# Patient Record
Sex: MALE | Race: WHITE | HISPANIC OR LATINO | URBAN - METROPOLITAN AREA
[De-identification: names, ages, dates, MRNs, and addresses within clinical notes are randomized per-mention and may not be internally consistent; named-entity substitution may affect disease eponyms.]

---

## 2021-07-18 ENCOUNTER — INPATIENT (INPATIENT)
Facility: HOSPITAL | Age: 46
LOS: 2 days | Discharge: HOME | DRG: 552 | End: 2021-07-21
Attending: INTERNAL MEDICINE | Admitting: HOSPITALIST
Payer: COMMERCIAL

## 2021-07-18 VITALS
SYSTOLIC BLOOD PRESSURE: 160 MMHG | DIASTOLIC BLOOD PRESSURE: 96 MMHG | TEMPERATURE: 98 F | RESPIRATION RATE: 20 BRPM | HEIGHT: 66 IN | OXYGEN SATURATION: 97 % | HEART RATE: 79 BPM | WEIGHT: 190.04 LBS

## 2021-07-18 DIAGNOSIS — M79.606 PAIN IN LEG, UNSPECIFIED: ICD-10-CM

## 2021-07-18 LAB
ANION GAP SERPL CALC-SCNC: 12 MMOL/L — SIGNIFICANT CHANGE UP (ref 5–17)
APPEARANCE UR: CLEAR — SIGNIFICANT CHANGE UP
BACTERIA # UR AUTO: NEGATIVE — SIGNIFICANT CHANGE UP
BASOPHILS # BLD AUTO: 0.08 K/UL — SIGNIFICANT CHANGE UP (ref 0–0.2)
BASOPHILS NFR BLD AUTO: 0.9 % — SIGNIFICANT CHANGE UP (ref 0–2)
BILIRUB UR-MCNC: NEGATIVE — SIGNIFICANT CHANGE UP
BUN SERPL-MCNC: 14 MG/DL — SIGNIFICANT CHANGE UP (ref 7–23)
CALCIUM SERPL-MCNC: 9.4 MG/DL — SIGNIFICANT CHANGE UP (ref 8.4–10.5)
CHLORIDE SERPL-SCNC: 105 MMOL/L — SIGNIFICANT CHANGE UP (ref 96–108)
CK SERPL-CCNC: 92 U/L — SIGNIFICANT CHANGE UP (ref 30–200)
CO2 SERPL-SCNC: 22 MMOL/L — SIGNIFICANT CHANGE UP (ref 22–31)
COLOR SPEC: SIGNIFICANT CHANGE UP
CREAT SERPL-MCNC: 0.82 MG/DL — SIGNIFICANT CHANGE UP (ref 0.5–1.3)
DIFF PNL FLD: ABNORMAL
EOSINOPHIL # BLD AUTO: 0.21 K/UL — SIGNIFICANT CHANGE UP (ref 0–0.5)
EOSINOPHIL NFR BLD AUTO: 2.4 % — SIGNIFICANT CHANGE UP (ref 0–6)
EPI CELLS # UR: 0 /HPF — SIGNIFICANT CHANGE UP
GLUCOSE SERPL-MCNC: 106 MG/DL — HIGH (ref 70–99)
GLUCOSE UR QL: NEGATIVE — SIGNIFICANT CHANGE UP
HCT VFR BLD CALC: 47.8 % — SIGNIFICANT CHANGE UP (ref 39–50)
HGB BLD-MCNC: 16 G/DL — SIGNIFICANT CHANGE UP (ref 13–17)
HYALINE CASTS # UR AUTO: 1 /LPF — SIGNIFICANT CHANGE UP (ref 0–2)
IMM GRANULOCYTES NFR BLD AUTO: 0.2 % — SIGNIFICANT CHANGE UP (ref 0–1.5)
KETONES UR-MCNC: NEGATIVE — SIGNIFICANT CHANGE UP
LEUKOCYTE ESTERASE UR-ACNC: NEGATIVE — SIGNIFICANT CHANGE UP
LYMPHOCYTES # BLD AUTO: 2.05 K/UL — SIGNIFICANT CHANGE UP (ref 1–3.3)
LYMPHOCYTES # BLD AUTO: 23.6 % — SIGNIFICANT CHANGE UP (ref 13–44)
MAGNESIUM SERPL-MCNC: 2.2 MG/DL — SIGNIFICANT CHANGE UP (ref 1.6–2.6)
MCHC RBC-ENTMCNC: 30 PG — SIGNIFICANT CHANGE UP (ref 27–34)
MCHC RBC-ENTMCNC: 33.5 GM/DL — SIGNIFICANT CHANGE UP (ref 32–36)
MCV RBC AUTO: 89.5 FL — SIGNIFICANT CHANGE UP (ref 80–100)
MONOCYTES # BLD AUTO: 0.77 K/UL — SIGNIFICANT CHANGE UP (ref 0–0.9)
MONOCYTES NFR BLD AUTO: 8.9 % — SIGNIFICANT CHANGE UP (ref 2–14)
NEUTROPHILS # BLD AUTO: 5.55 K/UL — SIGNIFICANT CHANGE UP (ref 1.8–7.4)
NEUTROPHILS NFR BLD AUTO: 64 % — SIGNIFICANT CHANGE UP (ref 43–77)
NITRITE UR-MCNC: NEGATIVE — SIGNIFICANT CHANGE UP
NRBC # BLD: 0 /100 WBCS — SIGNIFICANT CHANGE UP (ref 0–0)
PH UR: 7 — SIGNIFICANT CHANGE UP (ref 5–8)
PHOSPHATE SERPL-MCNC: 2.5 MG/DL — SIGNIFICANT CHANGE UP (ref 2.5–4.5)
PLATELET # BLD AUTO: 258 K/UL — SIGNIFICANT CHANGE UP (ref 150–400)
POTASSIUM SERPL-MCNC: 3.8 MMOL/L — SIGNIFICANT CHANGE UP (ref 3.5–5.3)
POTASSIUM SERPL-SCNC: 3.8 MMOL/L — SIGNIFICANT CHANGE UP (ref 3.5–5.3)
PROT UR-MCNC: NEGATIVE — SIGNIFICANT CHANGE UP
RBC # BLD: 5.34 M/UL — SIGNIFICANT CHANGE UP (ref 4.2–5.8)
RBC # FLD: 12.5 % — SIGNIFICANT CHANGE UP (ref 10.3–14.5)
RBC CASTS # UR COMP ASSIST: 2 /HPF — SIGNIFICANT CHANGE UP (ref 0–4)
SODIUM SERPL-SCNC: 139 MMOL/L — SIGNIFICANT CHANGE UP (ref 135–145)
SP GR SPEC: 1.01 — SIGNIFICANT CHANGE UP (ref 1.01–1.02)
UROBILINOGEN FLD QL: NEGATIVE — SIGNIFICANT CHANGE UP
WBC # BLD: 8.68 K/UL — SIGNIFICANT CHANGE UP (ref 3.8–10.5)
WBC # FLD AUTO: 8.68 K/UL — SIGNIFICANT CHANGE UP (ref 3.8–10.5)
WBC UR QL: 0 /HPF — SIGNIFICANT CHANGE UP (ref 0–5)

## 2021-07-18 PROCEDURE — 93971 EXTREMITY STUDY: CPT | Mod: 26,LT

## 2021-07-18 PROCEDURE — 73560 X-RAY EXAM OF KNEE 1 OR 2: CPT | Mod: 26,LT

## 2021-07-18 PROCEDURE — 73590 X-RAY EXAM OF LOWER LEG: CPT | Mod: 26,LT

## 2021-07-18 PROCEDURE — 73610 X-RAY EXAM OF ANKLE: CPT | Mod: 26,LT

## 2021-07-18 PROCEDURE — 99285 EMERGENCY DEPT VISIT HI MDM: CPT

## 2021-07-18 RX ORDER — DIAZEPAM 5 MG
5 TABLET ORAL ONCE
Refills: 0 | Status: DISCONTINUED | OUTPATIENT
Start: 2021-07-18 | End: 2021-07-18

## 2021-07-18 RX ORDER — ACETAMINOPHEN 500 MG
975 TABLET ORAL ONCE
Refills: 0 | Status: COMPLETED | OUTPATIENT
Start: 2021-07-18 | End: 2021-07-18

## 2021-07-18 RX ORDER — OXYCODONE HYDROCHLORIDE 5 MG/1
5 TABLET ORAL ONCE
Refills: 0 | Status: DISCONTINUED | OUTPATIENT
Start: 2021-07-18 | End: 2021-07-18

## 2021-07-18 RX ORDER — CYCLOBENZAPRINE HYDROCHLORIDE 10 MG/1
10 TABLET, FILM COATED ORAL ONCE
Refills: 0 | Status: COMPLETED | OUTPATIENT
Start: 2021-07-18 | End: 2021-07-18

## 2021-07-18 RX ORDER — IBUPROFEN 200 MG
400 TABLET ORAL ONCE
Refills: 0 | Status: COMPLETED | OUTPATIENT
Start: 2021-07-18 | End: 2021-07-18

## 2021-07-18 RX ORDER — LIDOCAINE 4 G/100G
1 CREAM TOPICAL ONCE
Refills: 0 | Status: COMPLETED | OUTPATIENT
Start: 2021-07-18 | End: 2021-07-18

## 2021-07-18 RX ADMIN — OXYCODONE HYDROCHLORIDE 5 MILLIGRAM(S): 5 TABLET ORAL at 19:42

## 2021-07-18 RX ADMIN — CYCLOBENZAPRINE HYDROCHLORIDE 10 MILLIGRAM(S): 10 TABLET, FILM COATED ORAL at 20:42

## 2021-07-18 RX ADMIN — Medication 5 MILLIGRAM(S): at 20:42

## 2021-07-18 RX ADMIN — Medication 975 MILLIGRAM(S): at 18:35

## 2021-07-18 RX ADMIN — LIDOCAINE 1 PATCH: 4 CREAM TOPICAL at 20:43

## 2021-07-18 RX ADMIN — Medication 400 MILLIGRAM(S): at 18:36

## 2021-07-18 NOTE — ED PROVIDER NOTE - PROGRESS NOTE DETAILS
Jon CHRISTIANSON (PGY-2)   pt was in worsening pain to his leg that he described before. ordered for oxycodone  orange tinged urine noted in urinal. he states he started noticing it last night. will order for ua/ucx Attending MD Palmer : On reassessment, paitent's XR normal, US normal and still in pain. On further history, patient stating pain feels likei t is radiating from his buttocks to his calf and causing the cramping. + SLR test to approx 30 degrees in LLE. Suspect scatica. Patient having difficulty ambulating 2/2 pain despite the pain medication and muscle relaxant he has been given. Will admit for pain control, PT/OT and lumbosacral MRI.

## 2021-07-18 NOTE — ED PROVIDER NOTE - OBJECTIVE STATEMENT
45 y/o M with pmhx of kidney stones presents to ED c/o intermittent L shin and calf pain x1 week, worsening over the last few days. Pt reports he was sleeping and when getting up out of bed he felt sudden LLE pain. Pain described as intermittent cramping sensation. Pain worsening over the last 2 days and now radiating to left hip. Pt states he feels most pain along his left shin and the pain is "inside" his leg. Pain radiates along lateral left leg up to left hip. Pt also reports left tingling but no numbness. Has been taking Tylenol with no relief, last dose this morning. Has also been applying ice with no relief. Reports difficulty ambulating 2/2 pain. Denies trauma, fall, or strenuous activity. Denies ankle pain, knee pain and weakness. Pt works in cleaning services and walks a lot.

## 2021-07-18 NOTE — ED PROVIDER NOTE - CLINICAL SUMMARY MEDICAL DECISION MAKING FREE TEXT BOX
DDx includes DVT vs tibial stress fracture vs electrolyte abnormality leading to muscle cramping. Will obtain X-ray of knee, tibfib, and ankle. Send for DVT US of LLE. Check electrolyte panel. Tylenol and ibuprofen for pain, and reassess.

## 2021-07-18 NOTE — ED ADULT NURSE NOTE - OBJECTIVE STATEMENT
45 y/o M, PMH kidney stones presents to ED c/o intermittent LLE pain x1 week, worse over the last few days, feels cramping, intermittent, now radiating to L hip in last few days. Reports he was getting OOB when he first felt the pain, no trauma/falls. Has been taking Tylenol and using ice at home with no relief, last dose Tylenol this am. Pr reports pain worse with ambulating.

## 2021-07-18 NOTE — ED PROVIDER NOTE - PHYSICAL EXAMINATION
GEN: NAD, awake, eyes open spontaneously  EYES: normal conjunctiva, perrl  CHEST/LUNGS: Non-tachypneic, CTAB, bilateral breath sounds  CARDIAC: Non-tachycardic, normal perfusion  ABDOMEN: Soft, NTND, No rebound/guarding  MSK: Left sided calf tenderness without overlying redness, warmth, or skin deformity. Strength and sensation intact in LLE. No pain with movement across left ankle or knee, but when pt moves his entire leg he feels pain is reproduced. No tenderness along the anterior tibia but pt states his pain is more in the muscles surrounding the tibia. Pt able to stand on left leg but having difficulty walking on leg.   SKIN: No rashes, no petechiae, no vesicles  NEURO: CN grossly intact, normal coordination, no focal motor or sensory deficits

## 2021-07-19 DIAGNOSIS — N20.0 CALCULUS OF KIDNEY: ICD-10-CM

## 2021-07-19 DIAGNOSIS — R09.89 OTHER SPECIFIED SYMPTOMS AND SIGNS INVOLVING THE CIRCULATORY AND RESPIRATORY SYSTEMS: ICD-10-CM

## 2021-07-19 DIAGNOSIS — Z29.9 ENCOUNTER FOR PROPHYLACTIC MEASURES, UNSPECIFIED: ICD-10-CM

## 2021-07-19 DIAGNOSIS — M79.605 PAIN IN LEFT LEG: ICD-10-CM

## 2021-07-19 LAB
CULTURE RESULTS: NO GROWTH — SIGNIFICANT CHANGE UP
SARS-COV-2 RNA SPEC QL NAA+PROBE: SIGNIFICANT CHANGE UP
SPECIMEN SOURCE: SIGNIFICANT CHANGE UP

## 2021-07-19 PROCEDURE — 72148 MRI LUMBAR SPINE W/O DYE: CPT | Mod: 26

## 2021-07-19 PROCEDURE — 99223 1ST HOSP IP/OBS HIGH 75: CPT

## 2021-07-19 RX ORDER — ONDANSETRON 8 MG/1
4 TABLET, FILM COATED ORAL EVERY 6 HOURS
Refills: 0 | Status: DISCONTINUED | OUTPATIENT
Start: 2021-07-19 | End: 2021-07-21

## 2021-07-19 RX ORDER — LIDOCAINE 4 G/100G
1 CREAM TOPICAL EVERY 24 HOURS
Refills: 0 | Status: DISCONTINUED | OUTPATIENT
Start: 2021-07-19 | End: 2021-07-21

## 2021-07-19 RX ORDER — ACETAMINOPHEN 500 MG
650 TABLET ORAL EVERY 6 HOURS
Refills: 0 | Status: DISCONTINUED | OUTPATIENT
Start: 2021-07-19 | End: 2021-07-21

## 2021-07-19 RX ORDER — MORPHINE SULFATE 50 MG/1
4 CAPSULE, EXTENDED RELEASE ORAL ONCE
Refills: 0 | Status: DISCONTINUED | OUTPATIENT
Start: 2021-07-19 | End: 2021-07-19

## 2021-07-19 RX ORDER — LANOLIN ALCOHOL/MO/W.PET/CERES
3 CREAM (GRAM) TOPICAL AT BEDTIME
Refills: 0 | Status: DISCONTINUED | OUTPATIENT
Start: 2021-07-19 | End: 2021-07-21

## 2021-07-19 RX ORDER — OXYCODONE HYDROCHLORIDE 5 MG/1
5 TABLET ORAL EVERY 6 HOURS
Refills: 0 | Status: DISCONTINUED | OUTPATIENT
Start: 2021-07-19 | End: 2021-07-21

## 2021-07-19 RX ORDER — ENOXAPARIN SODIUM 100 MG/ML
40 INJECTION SUBCUTANEOUS DAILY
Refills: 0 | Status: DISCONTINUED | OUTPATIENT
Start: 2021-07-19 | End: 2021-07-21

## 2021-07-19 RX ORDER — CYCLOBENZAPRINE HYDROCHLORIDE 10 MG/1
10 TABLET, FILM COATED ORAL THREE TIMES A DAY
Refills: 0 | Status: DISCONTINUED | OUTPATIENT
Start: 2021-07-19 | End: 2021-07-21

## 2021-07-19 RX ADMIN — Medication 650 MILLIGRAM(S): at 22:44

## 2021-07-19 RX ADMIN — OXYCODONE HYDROCHLORIDE 5 MILLIGRAM(S): 5 TABLET ORAL at 13:00

## 2021-07-19 RX ADMIN — OXYCODONE HYDROCHLORIDE 5 MILLIGRAM(S): 5 TABLET ORAL at 11:44

## 2021-07-19 RX ADMIN — Medication 650 MILLIGRAM(S): at 23:37

## 2021-07-19 RX ADMIN — MORPHINE SULFATE 4 MILLIGRAM(S): 50 CAPSULE, EXTENDED RELEASE ORAL at 03:34

## 2021-07-19 RX ADMIN — Medication 650 MILLIGRAM(S): at 18:52

## 2021-07-19 RX ADMIN — LIDOCAINE 1 PATCH: 4 CREAM TOPICAL at 06:01

## 2021-07-19 RX ADMIN — OXYCODONE HYDROCHLORIDE 5 MILLIGRAM(S): 5 TABLET ORAL at 20:30

## 2021-07-19 RX ADMIN — Medication 650 MILLIGRAM(S): at 16:46

## 2021-07-19 RX ADMIN — OXYCODONE HYDROCHLORIDE 5 MILLIGRAM(S): 5 TABLET ORAL at 19:20

## 2021-07-19 RX ADMIN — ENOXAPARIN SODIUM 40 MILLIGRAM(S): 100 INJECTION SUBCUTANEOUS at 11:43

## 2021-07-19 RX ADMIN — MORPHINE SULFATE 4 MILLIGRAM(S): 50 CAPSULE, EXTENDED RELEASE ORAL at 04:25

## 2021-07-19 RX ADMIN — LIDOCAINE 1 PATCH: 4 CREAM TOPICAL at 18:52

## 2021-07-19 RX ADMIN — LIDOCAINE 1 PATCH: 4 CREAM TOPICAL at 11:43

## 2021-07-19 NOTE — H&P ADULT - NSHPPHYSICALEXAM_GEN_ALL_CORE
Vital Signs Last 24 Hrs  T(C): 36.9 (07-19-21 @ 00:04), Max: 36.9 (07-19-21 @ 00:04)  T(F): 98.4 (07-19-21 @ 00:04), Max: 98.4 (07-19-21 @ 00:04)  HR: 84 (07-19-21 @ 00:04) (79 - 84)  BP: 158/89 (07-19-21 @ 00:04) (158/89 - 160/96)  BP(mean): --  RR: 19 (07-19-21 @ 00:04) (19 - 20)  SpO2: 98% (07-19-21 @ 00:04) (97% - 98%)

## 2021-07-19 NOTE — PHYSICAL THERAPY INITIAL EVALUATION ADULT - PERTINENT HX OF CURRENT PROBLEM, REHAB EVAL
47 y/o M with pmhx of kidney stones p/w intractable LLE pain thought to be from sciatica. All xrays (-) for acute pathology. VA Duplex LLE w/ no DVT.

## 2021-07-19 NOTE — H&P ADULT - HISTORY OF PRESENT ILLNESS
47 y/o M with pmhx of kidney stones presents to ED c/o intermittent L shin and calf pain x1 week, worsening over the last few days. Pt reports he was sleeping and when getting up out of bed he felt sudden LLE pain. Pain described as intermittent cramping sensation. Pain worsening over the last 2 days and now radiating to left hip. Pt states he feels most pain along his left shin and the pain is "inside" his leg. Pain radiates along lateral left leg up to left hip. Pt also reports left tingling but no numbness. Has been taking Tylenol with no relief, last dose this morning. Has also been applying ice with no relief. Reports difficulty ambulating 2/2 pain. Denies trauma, fall, or strenuous activity. Denies ankle pain, knee pain and weakness. Pt works in cleaning services and walks a lot. 47 y/o M with pmhx of kidney stones presents to ED c/o intermittent L shin and calf pain x4 days. Pt reports he was sleeping and when getting up out of bed he felt sudden LLE pain several days ago. Pain described as intermittent cramping sensation in buttock and alternating between burning/ periodic sharp jabs in tibial and knee region. Pain worsening over the last 2 days and now radiating to left hip. Pt states he feels most pain along his left shin and the pain is "inside" his leg. Pain radiates along lateral left leg up to left hip. Pt also reports left tingling but no numbness. Has been taking Tylenol with no relief, last dose this morning. Has also been applying ice with no relief. Reports difficulty ambulating 2/2 pain. Denies trauma, fall, or strenuous activity. Denies ankle pain, knee pain and weakness. Pt works in cleaning services and walks a lot. 47 y/o M with pmhx of kidney stones presents to ED c/o intermittent L shin and calf pain x4 days. Pt reports he was sleeping and when getting up out of bed he felt sudden LLE pain several days ago. Pain described as intermittent cramping sensation in buttock and alternating between burning/ periodic sharp jabs in tibial and knee region. Pain worsening over the last 2 days and now radiating to left hip. Pt states he feels most pain along his left shin and the pain is "inside" his leg. Pain radiates along lateral left leg up to left hip. Pt also reports left tingling but no numbness. Pain also largely present in L buttock. Has been taking Tylenol with no relief, last dose this morning. Also taken ibuprofen without benefit. Has also been applying ice with no relief. Reports difficulty ambulating 2/2 pain. Denies trauma, fall, or strenuous activity. Denies ankle pain, knee pain and weakness. Pt works in cleaning services and walks a lot. Denies any recent increase in physical activity, falls or trauma of any kind. Denies ever having this kind of incident before.     In ER: Given Tylenol 975mg, flexeril 10mg, valium 5mg, ibuprofen 400mg, oxycodone 5mg, lidocaine patch

## 2021-07-19 NOTE — H&P ADULT - NSICDXFAMILYHX_GEN_ALL_CORE_FT
FAMILY HISTORY:  Mother  Still living? Unknown  FH: diabetes mellitus, Age at diagnosis: Age Unknown

## 2021-07-19 NOTE — PROGRESS NOTE ADULT - PROBLEM SELECTOR PLAN 1
Lumbar rqdiculopathy , L5-S1 MRI done.  Spine ortho eval in AM  -Tylenol PRN mild pain  -Flexeril PRN moderate pain  -Oxycodone PRN severe pain  -  -Lidocaine patch PRN  -PT consult  -Falls precautions

## 2021-07-19 NOTE — H&P ADULT - PROBLEM SELECTOR PLAN 1
Suspicious for sciatica. Currently without alarm findings on history or exam. Patient's pain currently intractable and cannot make it home. Plan for further evaluation. ISTOP reviewed Reference #: [id number]  -Tylenol PRN mild pain  -Flexeril PRN moderate pain  -Oxycodone PRN severe pain  -Plan for MRI L-spine as symptoms not improving/worsening  -Lidocaine patch PRN  -PT consult  -Falls precautions

## 2021-07-19 NOTE — PROGRESS NOTE ADULT - PROBLEM SELECTOR PLAN 2
Currently denies any significant urinary complaints. Renal function appears normal.  -Trend BMP  -Monitor for urinary issues

## 2021-07-19 NOTE — PROGRESS NOTE ADULT - SUBJECTIVE AND OBJECTIVE BOX
Patient is a 46y old  Male who presents with a chief complaint of Intractable LLE pain (2021 00:12)      HPI:  Low back pain radiating to left leg ++, 8-9/10  PAST MEDICAL & SURGICAL HISTORY:  Kidney stones    Kidney stones    No significant past surgical history        Review of Systems:   CONSTITUTIONAL: No fever, weight loss, or fatigue  EYES: No eye pain, visual disturbances, or discharge  ENMT:  No difficulty hearing, tinnitus, vertigo; No sinus or throat pain  NECK: No pain or stiffness  BREASTS: No pain, masses, or nipple discharge  RESPIRATORY: No cough, wheezing, chills or hemoptysis; No shortness of breath  CARDIOVASCULAR: No chest pain, palpitations, dizziness, or leg swelling  GASTROINTESTINAL: No abdominal or epigastric pain. No nausea, vomiting, or hematemesis; No diarrhea or constipation. No melena or hematochezia.  GENITOURINARY: No dysuria, frequency, hematuria, or incontinence  NEUROLOGICAL: No headaches, memory loss, loss of strength, numbness, or tremors  SKIN: No itching, burning, rashes, or lesions   LYMPH NODES: No enlarged glands  ENDOCRINE: No heat or cold intolerance; No hair loss  MUSCULOSKELETAL: No joint pain or swelling;   PSYCHIATRIC: No depression, anxiety, mood swings, or difficulty sleeping  HEME/LYMPH: No easy bruising, or bleeding gums  ALLERY AND IMMUNOLOGIC: No hives or eczema    Allergies    No Known Allergies    Intolerances        Social History:     FAMILY HISTORY:  FH: diabetes mellitus (Mother)        MEDICATIONS  (STANDING):  enoxaparin Injectable 40 milliGRAM(s) SubCutaneous daily  lidocaine   Patch 1 Patch Transdermal every 24 hours    MEDICATIONS  (PRN):  acetaminophen   Tablet .. 650 milliGRAM(s) Oral every 6 hours PRN Mild Pain (1 - 3)  cyclobenzaprine 10 milliGRAM(s) Oral three times a day PRN Muscle Spasm  melatonin 3 milliGRAM(s) Oral at bedtime PRN Insomnia  ondansetron Injectable 4 milliGRAM(s) IV Push every 6 hours PRN Nausea and/or Vomiting  oxyCODONE    IR 5 milliGRAM(s) Oral every 6 hours PRN Severe Pain (7 - 10)        CAPILLARY BLOOD GLUCOSE        I&O's Summary    2021 07:01  -  2021 23:05  --------------------------------------------------------  IN: 660 mL / OUT: 0 mL / NET: 660 mL        PHYSICAL EXAM:  Vital Signs Last 24 Hrs  T(C): 36.7 (2021 21:56), Max: 36.9 (2021 00:04)  T(F): 98.1 (2021 21:56), Max: 98.4 (2021 00:04)  HR: 65 (2021 21:56) (65 - 84)  BP: 132/92 (2021 21:56) (132/92 - 158/89)  BP(mean): --  RR: 17 (2021 21:56) (17 - 19)  SpO2: 98% (2021 21:56) (96% - 100%)    GENERAL: NAD, well-developed  HEAD:  Atraumatic, Normocephalic  EYES: EOMI, PERRLA, conjunctiva and sclera clear  NECK: Supple, No JVD  CHEST/LUNG: Clear to auscultation bilaterally; No wheeze  HEART: Regular rate and rhythm; No murmurs, rubs, or gallops  ABDOMEN: Soft, Nontender, Nondistended; Bowel sounds present  EXTREMITIES:  2+ Peripheral Pulses, No clubbing, cyanosis, or edema  PSYCH: AAOx3  NEUROLOGY: Weakness in left leg  SKIN: No rashes or lesions    LABS:                        16.0   8.68  )-----------( 258      ( 2021 18:47 )             47.8     07-18    139  |  105  |  14  ----------------------------<  106<H>  3.8   |  22  |  0.82    Ca    9.4      2021 18:47  Phos  2.5     07-18  Mg     2.2     07-18        CARDIAC MARKERS ( 2021 18:47 )  x     / x     / 92 U/L / x     / x          Urinalysis Basic - ( 2021 20:22 )    Color: LIGHT BROWN / Appearance: Clear / S.014 / pH: x  Gluc: x / Ketone: Negative  / Bili: Negative / Urobili: Negative   Blood: x / Protein: Negative / Nitrite: Negative   Leuk Esterase: Negative / RBC: 2 /hpf / WBC 0 /HPF   Sq Epi: x / Non Sq Epi: 0 /hpf / Bacteria: Negative        RADIOLOGY & ADDITIONAL TESTS:    Imaging Personally Reviewed:    Consultant(s) Notes Reviewed:      Care Discussed with Consultants/Other Providers:

## 2021-07-19 NOTE — PHYSICAL THERAPY INITIAL EVALUATION ADULT - ACTIVE RANGE OF MOTION EXAMINATION, REHAB EVAL
LLE ROM limited 2/2 pain/bilateral upper extremity Active ROM was WFL (within functional limits)/bilateral  lower extremity Active ROM was WFL (within functional limits)

## 2021-07-20 LAB
COVID-19 SPIKE DOMAIN AB INTERP: POSITIVE
COVID-19 SPIKE DOMAIN ANTIBODY RESULT: >250 U/ML — HIGH
SARS-COV-2 IGG+IGM SERPL QL IA: >250 U/ML — HIGH
SARS-COV-2 IGG+IGM SERPL QL IA: POSITIVE

## 2021-07-20 PROCEDURE — 99221 1ST HOSP IP/OBS SF/LOW 40: CPT | Mod: GC

## 2021-07-20 RX ORDER — FAMOTIDINE 10 MG/ML
20 INJECTION INTRAVENOUS
Refills: 0 | Status: DISCONTINUED | OUTPATIENT
Start: 2021-07-20 | End: 2021-07-21

## 2021-07-20 RX ORDER — MORPHINE SULFATE 50 MG/1
2 CAPSULE, EXTENDED RELEASE ORAL ONCE
Refills: 0 | Status: DISCONTINUED | OUTPATIENT
Start: 2021-07-20 | End: 2021-07-20

## 2021-07-20 RX ORDER — OXYCODONE HYDROCHLORIDE 5 MG/1
5 TABLET ORAL ONCE
Refills: 0 | Status: DISCONTINUED | OUTPATIENT
Start: 2021-07-20 | End: 2021-07-20

## 2021-07-20 RX ORDER — DEXAMETHASONE 0.5 MG/5ML
4 ELIXIR ORAL
Refills: 0 | Status: DISCONTINUED | OUTPATIENT
Start: 2021-07-20 | End: 2021-07-21

## 2021-07-20 RX ADMIN — OXYCODONE HYDROCHLORIDE 5 MILLIGRAM(S): 5 TABLET ORAL at 11:00

## 2021-07-20 RX ADMIN — LIDOCAINE 1 PATCH: 4 CREAM TOPICAL at 11:12

## 2021-07-20 RX ADMIN — CYCLOBENZAPRINE HYDROCHLORIDE 10 MILLIGRAM(S): 10 TABLET, FILM COATED ORAL at 09:11

## 2021-07-20 RX ADMIN — CYCLOBENZAPRINE HYDROCHLORIDE 10 MILLIGRAM(S): 10 TABLET, FILM COATED ORAL at 21:33

## 2021-07-20 RX ADMIN — OXYCODONE HYDROCHLORIDE 5 MILLIGRAM(S): 5 TABLET ORAL at 02:12

## 2021-07-20 RX ADMIN — OXYCODONE HYDROCHLORIDE 5 MILLIGRAM(S): 5 TABLET ORAL at 09:10

## 2021-07-20 RX ADMIN — OXYCODONE HYDROCHLORIDE 5 MILLIGRAM(S): 5 TABLET ORAL at 22:23

## 2021-07-20 RX ADMIN — OXYCODONE HYDROCHLORIDE 5 MILLIGRAM(S): 5 TABLET ORAL at 17:53

## 2021-07-20 RX ADMIN — OXYCODONE HYDROCHLORIDE 5 MILLIGRAM(S): 5 TABLET ORAL at 09:11

## 2021-07-20 RX ADMIN — LIDOCAINE 1 PATCH: 4 CREAM TOPICAL at 20:17

## 2021-07-20 RX ADMIN — OXYCODONE HYDROCHLORIDE 5 MILLIGRAM(S): 5 TABLET ORAL at 01:23

## 2021-07-20 RX ADMIN — CYCLOBENZAPRINE HYDROCHLORIDE 10 MILLIGRAM(S): 10 TABLET, FILM COATED ORAL at 16:19

## 2021-07-20 RX ADMIN — OXYCODONE HYDROCHLORIDE 5 MILLIGRAM(S): 5 TABLET ORAL at 23:06

## 2021-07-20 RX ADMIN — OXYCODONE HYDROCHLORIDE 5 MILLIGRAM(S): 5 TABLET ORAL at 16:19

## 2021-07-20 RX ADMIN — MORPHINE SULFATE 2 MILLIGRAM(S): 50 CAPSULE, EXTENDED RELEASE ORAL at 04:07

## 2021-07-20 RX ADMIN — OXYCODONE HYDROCHLORIDE 5 MILLIGRAM(S): 5 TABLET ORAL at 07:52

## 2021-07-20 RX ADMIN — MORPHINE SULFATE 2 MILLIGRAM(S): 50 CAPSULE, EXTENDED RELEASE ORAL at 03:42

## 2021-07-20 RX ADMIN — ENOXAPARIN SODIUM 40 MILLIGRAM(S): 100 INJECTION SUBCUTANEOUS at 11:12

## 2021-07-20 NOTE — CONSULT NOTE ADULT - ASSESSMENT
A/P: 46y Male PMH of kidney stones who presents with 2 days of LLE radiculopathy 2/2 L4-L5 HNP and L5-S1 HNP.    - Multimodal pain control  - Decadron taper  - WBAT with assistive devices as needed  - SCDs  - May follow up with Dr. Vyas in 1-2 weeks following discharge. Call office at [phone] to make appointment  - Please page back as needed if you have questions.

## 2021-07-20 NOTE — CONSULT NOTE ADULT - SUBJECTIVE AND OBJECTIVE BOX
Orthopedic Spine Consult Note    Patient is a 46y Male PMH of kidney stones who presents with left buttocks pain that radiates down the left leg to the shin and ankle. Reports that the shooting, stabbing LLE pain that worsened over the past 2 days. Denies trauma. Denies HS/LOC. Denies pain/injury elsewhere. Reports left calf and ankle tingling but no numbness. Reports LLE weakness due to pain. Denies bowel/bladder incontinence. Denies fevers/chills. No other complaints at this time.    HEALTH ISSUES - PROBLEM Dx:  Suspected pulmonary embolism    Suspected deep vein thrombosis (DVT)    Pain of left lower extremity  Pain of left lower extremity    Kidney stones  Kidney stones    Prophylactic measure  Prophylactic measure            MEDICATIONS  (STANDING):  enoxaparin Injectable 40 milliGRAM(s) SubCutaneous daily  lidocaine   Patch 1 Patch Transdermal every 24 hours      Allergies    No Known Allergies    Intolerances        PAST MEDICAL & SURGICAL HISTORY:  Kidney stones    Kidney stones    No significant past surgical history                              16.0   8.68  )-----------( 258      ( 2021 18:47 )             47.8       2021 18:47    139    |  105    |  14     ----------------------------<  106    3.8     |  22     |  0.82     Ca    9.4        2021 18:47  Phos  2.5       2021 18:47  Mg     2.2       2021 18:47            Urinalysis Basic - ( 2021 20:22 )    Color: LIGHT BROWN / Appearance: Clear / S.014 / pH: x  Gluc: x / Ketone: Negative  / Bili: Negative / Urobili: Negative   Blood: x / Protein: Negative / Nitrite: Negative   Leuk Esterase: Negative / RBC: 2 /hpf / WBC 0 /HPF   Sq Epi: x / Non Sq Epi: 0 /hpf / Bacteria: Negative        Vital Signs Last 24 Hrs  T(C): 36.9 (21 @ 11:47), Max: 36.9 (21 @ 05:01)  T(F): 98.5 (21 @ 11:47), Max: 98.5 (21 @ 05:01)  HR: 71 (21 @ 11:47) (62 - 71)  BP: 141/82 (21 @ 11:47) (114/70 - 141/82)  BP(mean): --  RR: 18 (21 @ 11:47) (17 - 18)  SpO2: 95% (21 @ 11:47) (95% - 98%)      Physical exam  Gen: NAD, alert and oriented  Resp: no increased WOB on RA  Spine PE:  Skin intact  No gross deformity  No midline TTP C/T/L/S spine  No bony step offs  No paraspinal muscle ttp/hypertonicity  Negative clonus  Negative babinski  Negative darby  + rectal tone  No saddle anesthesia    Motor:                   C5                C6              C7               C8           T1   R            5/5                5/5            5/5             5/5          5/5  L             5/5               5/5             5/5             5/5          5/5                L2             L3             L4               L5            S1  R         5/5           5/5          5/5             5/5           5/5  L          5/5          5/5           5/5             5/5           5/5    Sensory:            C5         C6         C7      C8       T1        (0=absent, 1=impaired, 2=normal, NT=not testable)  R         2            2           2        2         2  L          2            2           2        2         2               L2          L3         L4      L5       S1         (0=absent, 1=impaired, 2=normal, NT=not testable)  R         2            2            2        2        2  L          2            2           2        2         2    Imaging:    A/P: 46y Male with  Pain control  WBAT with assistive devices as needed  FU Labs/imaging  SCDs Orthopedic Spine Consult Note    Patient is a 46y Male PMH of kidney stones who presents with left buttocks pain that radiates down the left leg to the shin and ankle. Reports that the shooting, stabbing LLE pain that worsened over the past 2 days. Denies trauma. Denies HS/LOC. Denies pain/injury elsewhere. Reports left calf and ankle tingling but no numbness. Reports LLE weakness due to pain. Denies bowel/bladder incontinence. Denies fevers/chills. No other complaints at this time.    HEALTH ISSUES - PROBLEM Dx:  Suspected pulmonary embolism    Suspected deep vein thrombosis (DVT)    Pain of left lower extremity  Pain of left lower extremity    Kidney stones  Kidney stones    Prophylactic measure  Prophylactic measure            MEDICATIONS  (STANDING):  enoxaparin Injectable 40 milliGRAM(s) SubCutaneous daily  lidocaine   Patch 1 Patch Transdermal every 24 hours      Allergies    No Known Allergies    Intolerances        PAST MEDICAL & SURGICAL HISTORY:  Kidney stones    Kidney stones    No significant past surgical history                              16.0   8.68  )-----------( 258      ( 2021 18:47 )             47.8       2021 18:47    139    |  105    |  14     ----------------------------<  106    3.8     |  22     |  0.82     Ca    9.4        2021 18:47  Phos  2.5       2021 18:47  Mg     2.2       2021 18:47            Urinalysis Basic - ( 2021 20:22 )    Color: LIGHT BROWN / Appearance: Clear / S.014 / pH: x  Gluc: x / Ketone: Negative  / Bili: Negative / Urobili: Negative   Blood: x / Protein: Negative / Nitrite: Negative   Leuk Esterase: Negative / RBC: 2 /hpf / WBC 0 /HPF   Sq Epi: x / Non Sq Epi: 0 /hpf / Bacteria: Negative        Vital Signs Last 24 Hrs  T(C): 36.9 (21 @ 11:47), Max: 36.9 (21 @ 05:01)  T(F): 98.5 (21 @ 11:47), Max: 98.5 (21 @ 05:01)  HR: 71 (21 @ 11:47) (62 - 71)  BP: 141/82 (21 @ 11:47) (114/70 - 141/82)  BP(mean): --  RR: 18 (21 @ 11:47) (17 - 18)  SpO2: 95% (21 @ 11:47) (95% - 98%)      Physical exam  Gen: NAD, alert and oriented  Resp: no increased WOB on RA  Spine PE:  Skin intact  No gross deformity  No midline TTP C/T/L/S spine  No bony step offs  Left paraspinal muscle ttp/hypertonicity  Negative clonus  Negative babinski  Negative darby  + rectal tone  No saddle anesthesia    Motor:                   C5                C6              C7               C8           T1   R            5/5                5/5            5/5             5/5          5/5  L             5/5               5/5             5/5             5/5          5/5                L2             L3             L4               L5            S1  R         5/5           5/5          5/5             5/5           5/5  L          5/5          5/5           4/5             4/5           4/5    Sensory:            C5         C6         C7      C8       T1        (0=absent, 1=impaired, 2=normal, NT=not testable)  R         2            2           2        2         2  L          2            2           2        2         2               L2          L3         L4      L5       S1         (0=absent, 1=impaired, 2=normal, NT=not testable)  R         2            2            2        2        2  L          2            2           2        2         2    Imaging:  < from: MR Lumbar Spine No Cont (21 @ 17:50) >  IMPRESSION: Please note the axial T 2s are limited. Annular fissures at L4-5 and L5-S1. Suspicion of left-sided disc protrusion L4-5 that abuts the intracanal left L5 root. Suspicion of a focal left-sided protrusion L5-S1 with mass impression on the intracanal left S1 root as well as disc material in the lateral compartment at L5-S1 abutting and indenting the intracanal left L5 root    --- End of Report ---                BON LACY MD; Attending Radiologist  This document has been electronically signed. 2021  7:59PM    < end of copied text >

## 2021-07-21 ENCOUNTER — TRANSCRIPTION ENCOUNTER (OUTPATIENT)
Age: 46
End: 2021-07-21

## 2021-07-21 VITALS
HEART RATE: 82 BPM | TEMPERATURE: 98 F | OXYGEN SATURATION: 96 % | SYSTOLIC BLOOD PRESSURE: 124 MMHG | RESPIRATION RATE: 18 BRPM | DIASTOLIC BLOOD PRESSURE: 72 MMHG

## 2021-07-21 PROCEDURE — 83735 ASSAY OF MAGNESIUM: CPT

## 2021-07-21 PROCEDURE — 73590 X-RAY EXAM OF LOWER LEG: CPT

## 2021-07-21 PROCEDURE — 72148 MRI LUMBAR SPINE W/O DYE: CPT

## 2021-07-21 PROCEDURE — 87086 URINE CULTURE/COLONY COUNT: CPT

## 2021-07-21 PROCEDURE — 86769 SARS-COV-2 COVID-19 ANTIBODY: CPT

## 2021-07-21 PROCEDURE — 82550 ASSAY OF CK (CPK): CPT

## 2021-07-21 PROCEDURE — 96374 THER/PROPH/DIAG INJ IV PUSH: CPT

## 2021-07-21 PROCEDURE — 73560 X-RAY EXAM OF KNEE 1 OR 2: CPT

## 2021-07-21 PROCEDURE — 73610 X-RAY EXAM OF ANKLE: CPT

## 2021-07-21 PROCEDURE — 81001 URINALYSIS AUTO W/SCOPE: CPT

## 2021-07-21 PROCEDURE — 93971 EXTREMITY STUDY: CPT

## 2021-07-21 PROCEDURE — 85025 COMPLETE CBC W/AUTO DIFF WBC: CPT

## 2021-07-21 PROCEDURE — 99285 EMERGENCY DEPT VISIT HI MDM: CPT

## 2021-07-21 PROCEDURE — 84100 ASSAY OF PHOSPHORUS: CPT

## 2021-07-21 PROCEDURE — 80048 BASIC METABOLIC PNL TOTAL CA: CPT

## 2021-07-21 PROCEDURE — 97161 PT EVAL LOW COMPLEX 20 MIN: CPT

## 2021-07-21 PROCEDURE — 87635 SARS-COV-2 COVID-19 AMP PRB: CPT

## 2021-07-21 RX ORDER — ACETAMINOPHEN 500 MG
2 TABLET ORAL
Qty: 0 | Refills: 0 | DISCHARGE
Start: 2021-07-21

## 2021-07-21 RX ORDER — CYCLOBENZAPRINE HYDROCHLORIDE 10 MG/1
1 TABLET, FILM COATED ORAL
Qty: 90 | Refills: 0
Start: 2021-07-21 | End: 2021-08-19

## 2021-07-21 RX ORDER — LANOLIN ALCOHOL/MO/W.PET/CERES
1 CREAM (GRAM) TOPICAL
Qty: 0 | Refills: 0 | DISCHARGE
Start: 2021-07-21

## 2021-07-21 RX ORDER — FAMOTIDINE 10 MG/ML
1 INJECTION INTRAVENOUS
Qty: 0 | Refills: 0 | DISCHARGE
Start: 2021-07-21

## 2021-07-21 RX ORDER — LIDOCAINE 4 G/100G
1 CREAM TOPICAL
Qty: 0 | Refills: 0 | DISCHARGE
Start: 2021-07-21

## 2021-07-21 RX ORDER — OXYCODONE HYDROCHLORIDE 5 MG/1
1 TABLET ORAL
Qty: 12 | Refills: 0
Start: 2021-07-21 | End: 2021-07-23

## 2021-07-21 RX ADMIN — Medication 4 MILLIGRAM(S): at 05:30

## 2021-07-21 RX ADMIN — CYCLOBENZAPRINE HYDROCHLORIDE 10 MILLIGRAM(S): 10 TABLET, FILM COATED ORAL at 05:29

## 2021-07-21 RX ADMIN — LIDOCAINE 1 PATCH: 4 CREAM TOPICAL at 00:24

## 2021-07-21 RX ADMIN — LIDOCAINE 1 PATCH: 4 CREAM TOPICAL at 11:47

## 2021-07-21 RX ADMIN — OXYCODONE HYDROCHLORIDE 5 MILLIGRAM(S): 5 TABLET ORAL at 05:30

## 2021-07-21 RX ADMIN — ENOXAPARIN SODIUM 40 MILLIGRAM(S): 100 INJECTION SUBCUTANEOUS at 11:47

## 2021-07-21 RX ADMIN — FAMOTIDINE 20 MILLIGRAM(S): 10 INJECTION INTRAVENOUS at 05:30

## 2021-07-21 RX ADMIN — OXYCODONE HYDROCHLORIDE 5 MILLIGRAM(S): 5 TABLET ORAL at 06:03

## 2021-07-21 NOTE — DISCHARGE NOTE PROVIDER - PROVIDER TOKENS
PROVIDER:[TOKEN:[[id number]],FOLLOWUP:[2 weeks]],PROVIDER:[TOKEN:[[id number]],FOLLOWUP:[2 weeks]]

## 2021-07-21 NOTE — DISCHARGE NOTE PROVIDER - CARE PROVIDERS DIRECT ADDRESSES
[email or web address]

## 2021-07-21 NOTE — DISCHARGE NOTE PROVIDER - HOSPITAL COURSE
· Assessment    A/P: 46y Male PMH of kidney stones who presents with 2 days of LLE radiculopathy 2/2 L4-L5 HNP and L5-S1 HNP.    - Multimodal pain control  - Decadron taper  - WBAT with assistive devices as needed  - SCDs  - May follow up with Dr. Vyas in 1-2 weeks following discharge. Call office at [phone] to make appointment  - Please page back as needed if you have questions.     Attestation Statements:

## 2021-07-21 NOTE — DISCHARGE NOTE PROVIDER - NSDCMRMEDTOKEN_GEN_ALL_CORE_FT
acetaminophen 325 mg oral tablet: 2 tab(s) orally every 6 hours, As needed, Mild Pain (1 - 3)  cyclobenzaprine 10 mg oral tablet: 1 tab(s) orally 3 times a day, As needed, Muscle Spasm  famotidine 20 mg oral tablet: 1 tab(s) orally 2 times a day  lidocaine 5% topical film: Apply topically to affected area once a day, As Needed  over the counter  melatonin 3 mg oral tablet: 1 tab(s) orally once a day (at bedtime), As needed, Insomnia  physical therapy : Apply topically to affected area once a day   Rx given to   rolling walker: Apply topically to affected area once a day   Rx given to    acetaminophen 325 mg oral tablet: 2 tab(s) orally every 6 hours, As needed, Mild Pain (1 - 3)  cyclobenzaprine 10 mg oral tablet: 1 tab(s) orally 3 times a day, As needed, Muscle Spasm  famotidine 20 mg oral tablet: 1 tab(s) orally 2 times a day  lidocaine 5% topical film: Apply topically to affected area once a day, As Needed  over the counter  Medrol Dosepak 4 mg oral tablet: 1 packet(s) orally once a day   melatonin 3 mg oral tablet: 1 tab(s) orally once a day (at bedtime), As needed, Insomnia  oxyCODONE 5 mg oral tablet: 1 tab(s) orally every 6 hours, As needed, Severe Pain (7 - 10) MDD:twenty milligrams max dose daily  physical therapy : Apply topically to affected area once a day   Rx given to   rolling walker: Apply topically to affected area once a day   Rx given to

## 2021-07-21 NOTE — DISCHARGE NOTE NURSING/CASE MANAGEMENT/SOCIAL WORK - PATIENT PORTAL LINK FT
You can access the FollowMyHealth Patient Portal offered by [facility] by registering at the following website: [email or web address]. By joining [facility]’s FollowMyHealth portal, you will also be able to view your health information using other applications (apps) compatible with our system.

## 2021-07-21 NOTE — DISCHARGE NOTE PROVIDER - CARE PROVIDER_API CALL
Andrzej Vyas)  Orthopedics  [address]  [place] [address]  Phone: [phone]  Fax: [phone]  Follow Up Time: 2 weeks    Radu Nolasco)  Internal Medicine  [address] [place] [address]  Phone: [phone]  Fax: [phone]  Follow Up Time: 2 weeks

## 2021-07-28 PROBLEM — Z00.00 ENCOUNTER FOR PREVENTIVE HEALTH EXAMINATION: Status: ACTIVE | Noted: 2021-07-28

## 2021-07-28 PROBLEM — N20.0 CALCULUS OF KIDNEY: Chronic | Status: ACTIVE | Noted: 2021-07-19

## 2021-08-10 ENCOUNTER — TRANSCRIPTION ENCOUNTER (OUTPATIENT)
Age: 46
End: 2021-08-10

## 2021-08-10 ENCOUNTER — APPOINTMENT (OUTPATIENT)
Dept: ORTHOPEDIC SURGERY | Facility: CLINIC | Age: 46
End: 2021-08-10

## 2022-04-03 NOTE — PHYSICAL THERAPY INITIAL EVALUATION ADULT - TRANSFER TRAINING, PT EVAL
Continued history:    -NIHSS: 2 for mild facial and mild LLE drift.  -On neurological examination, patient w/ mild L facial paresis, 4+/5 LUE, 4+/5 LLE, SILTx4, no ataxia. CTH and CTA H/N: preliminary read is negative. Patient evaluated by Neurosurgery in the ED s/p code stroke protocol imaging.    -CT Brain: Decreased attenuation along the anterior limb of the internal capsule on the right, not seen on prior MRI dated 3/9/2022. Cannot exclude an acute ischemic event in this location. No acute intracranial hemorrhage.      Swallow history : Pt denied history of swallow deficits. Continued history:    -NIHSS: 2 for mild facial and mild LLE drift.  -On neurological examination, patient w/ mild L facial paresis, 4+/5 LUE, 4+/5 LLE, SILTx4, no ataxia. CTH and CTA H/N: preliminary read is negative. Patient evaluated by Neurosurgery in the ED s/p code stroke protocol imaging.    -CT Brain: Decreased attenuation along the anterior limb of the internal capsule on the right, not seen on prior MRI dated 3/9/2022. Cannot exclude an acute ischemic event in this location. No acute intracranial hemorrhage.      Swallow history : Pt denied history of swallow deficits. Pt failed RN swallow screen in the ED. GOAL: Pt will perform sit to/from stand transfers independently with/without AD as needed within 1-2 weeks

## 2025-03-27 NOTE — PHYSICAL THERAPY INITIAL EVALUATION ADULT - ADDITIONAL COMMENTS
Call placed to pt and appointment scheduled from the referral received for [facility].  He had a sleep study done but has not yet been started on cpap.  
Pt lives w/ his spouse in an apt w/ 5 steps at entry, +RHR was independent w/ all ADLs and functional mobility at baseline.